# Patient Record
Sex: MALE | Race: OTHER | Employment: FULL TIME | URBAN - METROPOLITAN AREA
[De-identification: names, ages, dates, MRNs, and addresses within clinical notes are randomized per-mention and may not be internally consistent; named-entity substitution may affect disease eponyms.]

---

## 2023-02-25 ENCOUNTER — APPOINTMENT (OUTPATIENT)
Dept: GENERAL RADIOLOGY | Age: 28
End: 2023-02-25

## 2023-02-25 ENCOUNTER — HOSPITAL ENCOUNTER (EMERGENCY)
Age: 28
Discharge: HOME | End: 2023-02-25
Attending: EMERGENCY MEDICINE

## 2023-02-25 VITALS
OXYGEN SATURATION: 94 % | HEIGHT: 70 IN | TEMPERATURE: 98.1 F | WEIGHT: 190 LBS | SYSTOLIC BLOOD PRESSURE: 133 MMHG | DIASTOLIC BLOOD PRESSURE: 73 MMHG | BODY MASS INDEX: 27.2 KG/M2 | HEART RATE: 71 BPM | RESPIRATION RATE: 18 BRPM

## 2023-02-25 DIAGNOSIS — S61.421A LACERATION OF RIGHT HAND WITH FOREIGN BODY, INITIAL ENCOUNTER: Primary | ICD-10-CM

## 2023-02-25 PROCEDURE — 99284 EMERGENCY DEPT VISIT MOD MDM: CPT

## 2023-02-25 PROCEDURE — 74011250636 HC RX REV CODE- 250/636

## 2023-02-25 PROCEDURE — 90714 TD VACC NO PRESV 7 YRS+ IM: CPT

## 2023-02-25 PROCEDURE — 74011250637 HC RX REV CODE- 250/637

## 2023-02-25 PROCEDURE — 75810000293 HC SIMP/SUPERF WND  RPR

## 2023-02-25 PROCEDURE — 74011000250 HC RX REV CODE- 250

## 2023-02-25 PROCEDURE — 73130 X-RAY EXAM OF HAND: CPT

## 2023-02-25 PROCEDURE — 96372 THER/PROPH/DIAG INJ SC/IM: CPT

## 2023-02-25 PROCEDURE — 90471 IMMUNIZATION ADMIN: CPT

## 2023-02-25 RX ORDER — IBUPROFEN 800 MG/1
800 TABLET ORAL
Status: COMPLETED | OUTPATIENT
Start: 2023-02-25 | End: 2023-02-25

## 2023-02-25 RX ORDER — CEPHALEXIN 500 MG/1
500 CAPSULE ORAL 4 TIMES DAILY
Qty: 28 CAPSULE | Refills: 0 | Status: SHIPPED | OUTPATIENT
Start: 2023-02-25 | End: 2023-03-04

## 2023-02-25 RX ORDER — KETOROLAC TROMETHAMINE 30 MG/ML
30 INJECTION, SOLUTION INTRAMUSCULAR; INTRAVENOUS
Status: COMPLETED | OUTPATIENT
Start: 2023-02-25 | End: 2023-02-25

## 2023-02-25 RX ORDER — LIDOCAINE HYDROCHLORIDE AND EPINEPHRINE 10; 10 MG/ML; UG/ML
10 INJECTION, SOLUTION INFILTRATION; PERINEURAL ONCE
Status: DISCONTINUED | OUTPATIENT
Start: 2023-02-25 | End: 2023-02-25

## 2023-02-25 RX ORDER — LIDOCAINE HYDROCHLORIDE 10 MG/ML
10 INJECTION INFILTRATION; PERINEURAL ONCE
Status: COMPLETED | OUTPATIENT
Start: 2023-02-25 | End: 2023-02-25

## 2023-02-25 RX ORDER — BACITRACIN 500 UNIT/G
1 PACKET (EA) TOPICAL
Status: COMPLETED | OUTPATIENT
Start: 2023-02-25 | End: 2023-02-25

## 2023-02-25 RX ADMIN — KETOROLAC TROMETHAMINE 30 MG: 30 INJECTION, SOLUTION INTRAMUSCULAR at 15:24

## 2023-02-25 RX ADMIN — Medication 1 PACKET: at 15:24

## 2023-02-25 RX ADMIN — CLOSTRIDIUM TETANI TOXOID ANTIGEN (FORMALDEHYDE INACTIVATED) AND CORYNEBACTERIUM DIPHTHERIAE TOXOID ANTIGEN (FORMALDEHYDE INACTIVATED) 0.5 ML: 5; 2 INJECTION, SUSPENSION INTRAMUSCULAR at 14:03

## 2023-02-25 RX ADMIN — LIDOCAINE HYDROCHLORIDE 1 ML: 10 INJECTION, SOLUTION INFILTRATION; PERINEURAL at 14:18

## 2023-02-25 RX ADMIN — IBUPROFEN 800 MG: 800 TABLET, FILM COATED ORAL at 14:02

## 2023-02-25 NOTE — Clinical Note
[address] [place] [address] Dr ERUM LAKH[facility] EMERGENCY DEPT  [phone] [id number]  [phone]    Work/School Note    Date: 2/25/2023    To Whom It May concern:      Gelndy was seen and treated today in the emergency room by the following provider(s):  Attending Provider: Adarsh Gatica MD  Physician Assistant: Mingo Romeo PA-C. Glendy is excused from work/school on 02/25/23. He is clear to return to work/school on 02/26/23.         Sincerely,          Petty Escamilla PA-C

## 2023-02-25 NOTE — ED PROVIDER NOTES
Laceration      Swati Chaudhari is a 32 y.o. male with no relevant past medical history who presents ambulatory to [facility] ED with cc of hand laceration. Patient reports approximately 20 minutes PTA, he sustained a laceration to his right dorsal hand after shattering a piece of glass while replacing his car window. Patient reports 6 out of 10 pain. Patient reports pulling at least 1 piece of glass out of his hand PTA. Denies fever, chills, any other injuries. Patient reports coldness to his hand but denies numbness, tingling, sensory deficits. Tetanus not UTD. PCP: None    There are no other complaints, changes or physical findings at this time. No past medical history on file. No past surgical history on file. No family history on file. Social History     Socioeconomic History    Marital status:      Spouse name: Not on file    Number of children: Not on file    Years of education: Not on file    Highest education level: Not on file   Occupational History    Not on file   Tobacco Use    Smoking status: Not on file    Smokeless tobacco: Not on file   Substance and Sexual Activity    Alcohol use: Not on file    Drug use: Not on file    Sexual activity: Not on file   Other Topics Concern    Not on file   Social History Narrative    Not on file     Social Determinants of Health     Financial Resource Strain: Not on file   Food Insecurity: Not on file   Transportation Needs: Not on file   Physical Activity: Not on file   Stress: Not on file   Social Connections: Not on file   Intimate Partner Violence: Not on file   Housing Stability: Not on file         ALLERGIES: Patient has no known allergies. Review of Systems   Constitutional:  Negative for activity change, appetite change, chills and fever. HENT:  Negative for congestion, rhinorrhea and sore throat. Respiratory:  Negative for cough and shortness of breath. Cardiovascular:  Negative for chest pain.    Gastrointestinal: Negative for abdominal pain, nausea and vomiting. Genitourinary:  Negative for decreased urine volume and difficulty urinating. Musculoskeletal:  Negative for arthralgias and myalgias. Skin:  Positive for wound. Negative for color change and rash. Neurological:  Negative for light-headedness and headaches. Psychiatric/Behavioral:  Negative for agitation and confusion. The patient is not nervous/anxious. Vitals:    02/25/23 1329 02/25/23 1336 02/25/23 1542   BP: 121/82  133/73   Pulse: 85  71   Resp: 16  18   Temp: 98.1 °F (36.7 °C)     SpO2: 97%  94%   Weight: 86.2 kg (190 lb) 86.2 kg (190 lb)    Height:  5' 10\" (1.778 m)             Physical Exam  Vitals and nursing note reviewed. Constitutional:       Appearance: Normal appearance. HENT:      Head: Normocephalic and atraumatic. Right Ear: External ear normal.      Left Ear: External ear normal.      Nose: Nose normal.      Mouth/Throat:      Mouth: Mucous membranes are moist.   Eyes:      Extraocular Movements: Extraocular movements intact. Conjunctiva/sclera: Conjunctivae normal.      Pupils: Pupils are equal, round, and reactive to light. Cardiovascular:      Rate and Rhythm: Normal rate and regular rhythm. Pulses: Normal pulses. Heart sounds: Normal heart sounds. Pulmonary:      Effort: Pulmonary effort is normal.      Breath sounds: Normal breath sounds. Abdominal:      Palpations: Abdomen is soft. Musculoskeletal:         General: Normal range of motion. Cervical back: Normal range of motion and neck supple. Skin:     General: Skin is warm and dry. Capillary Refill: Capillary refill takes less than 2 seconds. Comments: R dorsal hand: Approximately 5 cm linear superficial laceration. Hemostasis mostly achieved at time of exam.  Wound was thoroughly explored and irrigated and no foreign body was found. Neurovascularly intact. Skin otherwise intact. Range of motion intact.    Neurological: General: No focal deficit present. Mental Status: He is alert and oriented to person, place, and time. Mental status is at baseline. Psychiatric:         Mood and Affect: Mood normal.         Behavior: Behavior normal.         Thought Content: Thought content normal.         Judgment: Judgment normal.        Medical Decision Making  Ddx: Laceration, tendon injury, foreign body retained, and others    66-year-old male presents with laceration to right dorsal hand after cutting it on broken glass. X-ray remarkable for punctate radiodensity and soft tissue swelling. Hand was anesthetized, thoroughly irrigated, and thoroughly explored by myself and attending Dr. Aurelio Kaye, foreign body not found. Hand was sutured loosely. Tetanus given. Gave Motrin and Toradol for pain as patient does not have anyone to drive him home. Discharged with wound care, Keflex, Motrin, Tylenol, suture removal in 10 days, PCP follow-up, hand specialist referral, strict return precautions. Amount and/or Complexity of Data Reviewed  Radiology: ordered. Risk  OTC drugs. Prescription drug management. Wound Repair    Date/Time: 2/25/2023 3:51 PM  Preparation: skin prepped with Betadine  Pre-procedure re-eval: Immediately prior to the procedure, the patient was reevaluated and found suitable for the planned procedure and any planned medications. Time out: Immediately prior to the procedure a time out was called to verify the correct patient, procedure, equipment, staff and marking as appropriate. .  Location details: right hand  Wound length:2.6 - 7.5 cm  Anesthesia: local infiltration    Anesthesia:  Local Anesthetic: lidocaine 1% without epinephrine  Anesthetic total: 12 mL  Foreign body present: suspected glass per history and XR; wound was thoroughly irrigated and explored by myself and Dr. Aurelio Kaye and no foreign body appreciated.   Irrigation solution: saline  Irrigation method: jet lavage  Debridement: none  Skin closure: 4-0 nylon  Number of sutures: 7  Technique: simple  Approximation: loose  Dressing: antibiotic ointment and non-adhesive packing strip  Patient tolerance: patient tolerated the procedure well with no immediate complications  My total time at bedside, performing this procedure was 16-30 minutes. LABORATORY TESTS:  No results found for this or any previous visit (from the past 12 hour(s)). IMAGING RESULTS:  XR HAND RT MIN 3 V   Final Result   No acute fracture. There is a punctate radiodensity within the soft   tissue swelling over the dorsum of the hand seen on the oblique view. MEDICATIONS GIVEN:  Medications   tetanus-diphtheria toxids-td 5-2 Lf unit/0.5 mL injection 0.5 mL (0.5 mL IntraMUSCular Given 2/25/23 1403)   ibuprofen (MOTRIN) tablet 800 mg (800 mg Oral Given 2/25/23 1402)   lidocaine (XYLOCAINE) 10 mg/mL (1 %) injection 1 mL (1 mL IntraDERMal Given 2/25/23 1418)   bacitracin 500 unit/gram packet 1 Packet (1 Packet Topical Given 2/25/23 1524)   ketorolac (TORADOL) injection 30 mg (30 mg IntraMUSCular Given 2/25/23 1524)       IMPRESSION:  1. Laceration of right hand with foreign body, initial encounter        PLAN:  1. Discharge Medication List as of 2/25/2023  3:14 PM        START taking these medications    Details   cephALEXin (Keflex) 500 mg capsule Take 1 Capsule by mouth four (4) times daily for 7 days. , Print, Disp-28 Capsule, R-0           2.    Follow-up Information       Follow up With Specialties Details Why Contact Info    [facility] EM[facility] Emergency Medicine Go to  As needed, If symptoms worsen [address] [facility] [address] [facility]  Schedule an appointment as soon as possible for a visit  As needed, If symptoms worsen [phone]    Collin Marx  Schedule an appointment as soon as possible for a visit   [place] [address]  [phone]          3. Return to ED if worse     Presentation, management, and disposition were discussed with the attending physician, Dr. Carmen Villafana, who is in agreement with plan of care.

## 2023-02-25 NOTE — ED TRIAGE NOTES
Pt arrives to ED for laceration to right hand from glass 20 min PTA. States was installing window on car when broke. Bleeding not controlled. Pressure dressing placed. Not UTD on tetanus    Pt states that fingers feel numb. PMS intact.      Afghan interpretor [id number]

## 2023-02-25 NOTE — ED NOTES
Pt given discharge instructions, patient education, 1 prescription and follow up information. Pt verbalizes understanding. All questions answered. Pt discharged to home in private vehicle, ambulatory. Pt A&Ox4, RA, pain controlled.

## 2023-02-25 NOTE — DISCHARGE INSTRUCTIONS
Keep the area clean and dry for 24 hours. After that, you may shower or bathe as normal but do not scrub the area or submerge in water. You may apply over-the-counter antibiotic ointment and a Band-Aid to keep it covered. Have your stitches removed in 10 days. You may have this done at ER, urgent care or PCP. Follow up with your PCP for further management. You are also being referred to a hand surgeon for follow-up. You may take Ibuprofen 800mg every 6-8 hours as needed for pain. You may also alternate with Tylenol 1000mg every 6-8 hours as needed for pain. You are also being prescribed an antibiotic to prevent infection. Return to the ER if you experience severe worsening symptoms.

## 2023-03-03 ENCOUNTER — HOSPITAL ENCOUNTER (EMERGENCY)
Age: 28
Discharge: HOME | End: 2023-03-03
Attending: EMERGENCY MEDICINE

## 2023-03-03 VITALS
WEIGHT: 216 LBS | OXYGEN SATURATION: 97 % | DIASTOLIC BLOOD PRESSURE: 70 MMHG | SYSTOLIC BLOOD PRESSURE: 119 MMHG | BODY MASS INDEX: 30.92 KG/M2 | RESPIRATION RATE: 18 BRPM | HEART RATE: 80 BPM | TEMPERATURE: 98.2 F | HEIGHT: 70 IN

## 2023-03-03 DIAGNOSIS — Z48.02 ENCOUNTER FOR REMOVAL OF SUTURES: Primary | ICD-10-CM

## 2023-03-03 PROCEDURE — 75810000275 HC EMERGENCY DEPT VISIT NO LEVEL OF CARE

## 2023-03-03 NOTE — ED TRIAGE NOTES
Tuvaluan interpretor used in triage: [id number]    Patient arrives stating need to have sutures removed. States was seen on Saturday at [place] ED after cutting hand with glass. Present with sutures on right hand/wrist.    Reports numbness on top of right hand, distal to the sutures. Denies fever, N/V, increased pain at site.

## 2023-03-03 NOTE — ED PROVIDER NOTES
33 yo male who present for suture removal. Pt was seen on 2/25/23 and sutures were placed to the R hand. Patient reports he was todl to return in1  week for suture removal. No pain. Reports wound healing well, no tenderness. Suture Removal  Pertinent negatives include no chest pain, no abdominal pain, no headaches and no shortness of breath. Wound Check   Pertinent negatives include no back pain and no neck pain. No past medical history on file. No past surgical history on file. No family history on file. Social History     Socioeconomic History    Marital status:      Spouse name: Not on file    Number of children: Not on file    Years of education: Not on file    Highest education level: Not on file   Occupational History    Not on file   Tobacco Use    Smoking status: Not on file    Smokeless tobacco: Not on file   Substance and Sexual Activity    Alcohol use: Not on file    Drug use: Not on file    Sexual activity: Not on file   Other Topics Concern    Not on file   Social History Narrative    Not on file     Social Determinants of Health     Financial Resource Strain: Not on file   Food Insecurity: Not on file   Transportation Needs: Not on file   Physical Activity: Not on file   Stress: Not on file   Social Connections: Not on file   Intimate Partner Violence: Not on file   Housing Stability: Not on file         ALLERGIES: Patient has no known allergies. Review of Systems   Constitutional:  Negative for activity change, fatigue and fever. HENT:  Negative for congestion, ear discharge, ear pain, sinus pressure, sore throat and trouble swallowing. Eyes:  Negative for pain, discharge and visual disturbance. Respiratory:  Negative for chest tightness, shortness of breath and wheezing. Cardiovascular:  Negative for chest pain, palpitations and leg swelling. Gastrointestinal:  Negative for abdominal pain, nausea and vomiting.    Musculoskeletal:  Negative for back pain, neck pain and neck stiffness. Skin:  Negative for color change, rash and wound. Neurological:  Negative for dizziness, weakness and headaches. Psychiatric/Behavioral:  Negative for agitation, behavioral problems and confusion. All other systems reviewed and are negative. Vitals:    03/03/23 1543   BP: 119/70   Pulse: 80   Resp: 18   Temp: 98.2 °F (36.8 °C)   SpO2: 97%   Weight: 98 kg (216 lb)   Height: 5' 10\" (1.778 m)            Physical Exam  Vitals and nursing note reviewed. Constitutional:       Appearance: Normal appearance. HENT:      Head: Normocephalic and atraumatic. Mouth/Throat:      Mouth: Mucous membranes are moist.   Eyes:      Conjunctiva/sclera: Conjunctivae normal.      Pupils: Pupils are equal, round, and reactive to light. Pulmonary:      Effort: Pulmonary effort is normal.   Musculoskeletal:      Right hand: No tenderness or bony tenderness. Lacerations: Well healing wound over the dorsum of the R Hand, 7 sutures in place, no surrounding erythema or edema,. Normal range of motion. Arms:       Cervical back: Normal range of motion and neck supple. Neurological:      Mental Status: He is alert. Medical Decision Making  33 yo M who presents with suture removal. On exam pts wound is well healing. Suture were removed, two steri strips were placed to prevent wound dehiscence as the area is over area of the hand that will have increase movement. No signs of infection or dehiscence at this time. Discussed steri strip care. Discussed signs of infection with patient.             Suture/Staple Removal    Date/Time: 3/3/2023 4:15 PM  Performed by: Ricarda Matta PA-C  Authorized by: Ricarda Matta PA-C     Consent:     Consent obtained:  Verbal    Consent given by:  Patient    Risks, benefits, and alternatives were discussed: yes      Risks discussed:  Bleeding, pain and wound separation    Alternatives discussed:  No treatment and delayed treatment  Universal protocol: Patient identity confirmed:  Verbally with patient  Location:     Location:  Upper extremity    Upper extremity location:  Hand    Hand location:  R hand  Procedure details:     Wound appearance:  No signs of infection, good wound healing and clean    Number of sutures removed:  7  Post-procedure details:     Post-removal:  Steri-Strips applied    Procedure completion:  Tolerated        IMAGING RESULTS:  No orders to display     Further personalized recommendations for outpatient care as below. Key Discharge Instructions and summary of care: Discussed wound care and signs of infection      4:16 PM Patient and/or family verbally conveyed their understanding and agreement of the patient's signs, symptoms, diagnosis, treatment and prognosis and additionally agree to follow-up as recommended in the discharge instructions or to return to the Emergency Department should their condition change or worsen prior to their follow-up appointment. All questions have been answered and patient and/or available family express understanding. ED Impression:    ICD-10-CM ICD-9-CM    1.  Encounter for removal of sutures  Z48.02 V58.32            DISPOSITION: Discharged    Jeison Gray PA-C